# Patient Record
Sex: MALE | Race: OTHER | NOT HISPANIC OR LATINO | URBAN - METROPOLITAN AREA
[De-identification: names, ages, dates, MRNs, and addresses within clinical notes are randomized per-mention and may not be internally consistent; named-entity substitution may affect disease eponyms.]

---

## 2023-01-06 ENCOUNTER — EMERGENCY (EMERGENCY)
Facility: HOSPITAL | Age: 23
LOS: 1 days | Discharge: HOME | End: 2023-01-06
Attending: EMERGENCY MEDICINE | Admitting: EMERGENCY MEDICINE
Payer: MEDICAID

## 2023-01-06 VITALS
RESPIRATION RATE: 17 BRPM | OXYGEN SATURATION: 99 % | TEMPERATURE: 99 F | SYSTOLIC BLOOD PRESSURE: 133 MMHG | HEART RATE: 84 BPM | DIASTOLIC BLOOD PRESSURE: 80 MMHG

## 2023-01-06 VITALS
DIASTOLIC BLOOD PRESSURE: 95 MMHG | HEART RATE: 101 BPM | RESPIRATION RATE: 18 BRPM | SYSTOLIC BLOOD PRESSURE: 145 MMHG | OXYGEN SATURATION: 100 % | TEMPERATURE: 98 F

## 2023-01-06 DIAGNOSIS — Z98.890 OTHER SPECIFIED POSTPROCEDURAL STATES: Chronic | ICD-10-CM

## 2023-01-06 LAB
ALBUMIN SERPL ELPH-MCNC: 4.9 G/DL — SIGNIFICANT CHANGE UP (ref 3.3–5)
ALP SERPL-CCNC: 57 U/L — SIGNIFICANT CHANGE UP (ref 40–120)
ALT FLD-CCNC: 14 U/L — SIGNIFICANT CHANGE UP (ref 4–41)
ANION GAP SERPL CALC-SCNC: 11 MMOL/L — SIGNIFICANT CHANGE UP (ref 7–14)
AST SERPL-CCNC: 19 U/L — SIGNIFICANT CHANGE UP (ref 4–40)
BASOPHILS # BLD AUTO: 0.02 K/UL — SIGNIFICANT CHANGE UP (ref 0–0.2)
BASOPHILS NFR BLD AUTO: 0.3 % — SIGNIFICANT CHANGE UP (ref 0–2)
BILIRUB SERPL-MCNC: 0.7 MG/DL — SIGNIFICANT CHANGE UP (ref 0.2–1.2)
BUN SERPL-MCNC: 9 MG/DL — SIGNIFICANT CHANGE UP (ref 7–23)
CALCIUM SERPL-MCNC: 9.8 MG/DL — SIGNIFICANT CHANGE UP (ref 8.4–10.5)
CHLORIDE SERPL-SCNC: 102 MMOL/L — SIGNIFICANT CHANGE UP (ref 98–107)
CO2 SERPL-SCNC: 23 MMOL/L — SIGNIFICANT CHANGE UP (ref 22–31)
CREAT SERPL-MCNC: 0.93 MG/DL — SIGNIFICANT CHANGE UP (ref 0.5–1.3)
EGFR: 121 ML/MIN/1.73M2 — SIGNIFICANT CHANGE UP
EOSINOPHIL # BLD AUTO: 0 K/UL — SIGNIFICANT CHANGE UP (ref 0–0.5)
EOSINOPHIL NFR BLD AUTO: 0 % — SIGNIFICANT CHANGE UP (ref 0–6)
GLUCOSE SERPL-MCNC: 141 MG/DL — HIGH (ref 70–99)
HCT VFR BLD CALC: 45.3 % — SIGNIFICANT CHANGE UP (ref 39–50)
HGB BLD-MCNC: 15.1 G/DL — SIGNIFICANT CHANGE UP (ref 13–17)
IANC: 5.73 K/UL — SIGNIFICANT CHANGE UP (ref 1.8–7.4)
IMM GRANULOCYTES NFR BLD AUTO: 0.3 % — SIGNIFICANT CHANGE UP (ref 0–0.9)
LIDOCAIN IGE QN: 30 U/L — SIGNIFICANT CHANGE UP (ref 7–60)
LYMPHOCYTES # BLD AUTO: 1.02 K/UL — SIGNIFICANT CHANGE UP (ref 1–3.3)
LYMPHOCYTES # BLD AUTO: 13.7 % — SIGNIFICANT CHANGE UP (ref 13–44)
MCHC RBC-ENTMCNC: 27.8 PG — SIGNIFICANT CHANGE UP (ref 27–34)
MCHC RBC-ENTMCNC: 33.3 GM/DL — SIGNIFICANT CHANGE UP (ref 32–36)
MCV RBC AUTO: 83.4 FL — SIGNIFICANT CHANGE UP (ref 80–100)
MONOCYTES # BLD AUTO: 0.65 K/UL — SIGNIFICANT CHANGE UP (ref 0–0.9)
MONOCYTES NFR BLD AUTO: 8.7 % — SIGNIFICANT CHANGE UP (ref 2–14)
NEUTROPHILS # BLD AUTO: 5.73 K/UL — SIGNIFICANT CHANGE UP (ref 1.8–7.4)
NEUTROPHILS NFR BLD AUTO: 77 % — SIGNIFICANT CHANGE UP (ref 43–77)
NRBC # BLD: 0 /100 WBCS — SIGNIFICANT CHANGE UP (ref 0–0)
NRBC # FLD: 0 K/UL — SIGNIFICANT CHANGE UP (ref 0–0)
PLATELET # BLD AUTO: 257 K/UL — SIGNIFICANT CHANGE UP (ref 150–400)
POTASSIUM SERPL-MCNC: 3.9 MMOL/L — SIGNIFICANT CHANGE UP (ref 3.5–5.3)
POTASSIUM SERPL-SCNC: 3.9 MMOL/L — SIGNIFICANT CHANGE UP (ref 3.5–5.3)
PROT SERPL-MCNC: 7.4 G/DL — SIGNIFICANT CHANGE UP (ref 6–8.3)
RBC # BLD: 5.43 M/UL — SIGNIFICANT CHANGE UP (ref 4.2–5.8)
RBC # FLD: 12.8 % — SIGNIFICANT CHANGE UP (ref 10.3–14.5)
SODIUM SERPL-SCNC: 136 MMOL/L — SIGNIFICANT CHANGE UP (ref 135–145)
WBC # BLD: 7.44 K/UL — SIGNIFICANT CHANGE UP (ref 3.8–10.5)
WBC # FLD AUTO: 7.44 K/UL — SIGNIFICANT CHANGE UP (ref 3.8–10.5)

## 2023-01-06 PROCEDURE — 99284 EMERGENCY DEPT VISIT MOD MDM: CPT

## 2023-01-06 RX ORDER — FAMOTIDINE 10 MG/ML
20 INJECTION INTRAVENOUS ONCE
Refills: 0 | Status: COMPLETED | OUTPATIENT
Start: 2023-01-06 | End: 2023-01-06

## 2023-01-06 RX ADMIN — Medication 30 MILLILITER(S): at 13:15

## 2023-01-06 RX ADMIN — FAMOTIDINE 20 MILLIGRAM(S): 10 INJECTION INTRAVENOUS at 13:15

## 2023-01-06 NOTE — ED ADULT TRIAGE NOTE - CHIEF COMPLAINT QUOTE
Pt AOX4 c/o bloating in stomach and multiple episodes of vomiting; pt does have Hx of GERD; pt admits to recent excessive overuse of marijuana and he also vapes nicotine; has Hx of anxiety recently restarted Escitalopram 4 days ago   Takes Famotidine Pt AOX4 c/o bloating in stomach and multiple episodes of vomiting; pt does have Hx of GERD; pt admits to recent excessive overuse of marijuana and he also vapes nicotine; has Hx of anxiety recently restarted Escitalopram 4 days ago; Pt arrives with Mother - requests discretion discussing his use of marijuana please  Takes Famotidine

## 2023-01-06 NOTE — ED ADULT NURSE NOTE - OBJECTIVE STATEMENT
Pt received to [facility] , awake and alert, A&OX4, ambulatory. C/o generalized abd pain x1-2 days and nausea with associated vomiting. States he vomited 2x today and 2x yesterday. Denies bloody or bilious vomiting. States he recently restarted Lexapro. States he takes prescribed Famotidine at home without relief. Reporting regular bowel movements. Respirations even and unlabored. Resting comfortably. Denies CP, SOB, N/V, HA, dizziness, palpitations, fatigue. 20G IV placed to RAC. Bed in lowest position, call bell within reach. NAD

## 2023-01-06 NOTE — ED PROVIDER NOTE - PROGRESS NOTE DETAILS
SUZAN Masters SUZAN Masters: Workup reviewed - non-fasting glucose 141, remaining labs within normal limits, will have patient follow-up with PMD for reevaluation of glucose levels. Upon reassessment, patient non-tender on abdominal exam and reporting improvement of nausea with medications, no vomiting while in ED. Shared decision making with patient - discussed in depth importance of discontinuing marijuana/ nicotine vaping since it's likely contributing to repeated episodes of nausea/vomiting every few months, pt expressed willingness to quit and will make efforts to decrease marijuana use. Patient is stable for discharge and hemodynamically stable. Patient expressed understanding and was agreeable with plan, strict return precautions discussed. SUZAN Masters: Workup reviewed - non-fasting glucose 141, remaining labs within normal limits, will have patient follow-up with PMD for reevaluation of glucose levels. Upon reassessment, patient non-tender on abdominal exam and reporting improvement of nausea and epigastric pain with medications, no vomiting while in ED. Shared decision making with patient - discussed in depth importance of discontinuing marijuana/ nicotine vaping since it's likely contributing to repeated episodes of nausea/vomiting every few months, pt expressed willingness to quit and will make efforts to decrease marijuana use. Patient is stable for discharge and hemodynamically stable. Patient expressed understanding and was agreeable with plan, strict return precautions discussed.

## 2023-01-06 NOTE — ED PROVIDER NOTE - OBJECTIVE STATEMENT
22M with PMH GERD, Depression/Anxiety who presents to ED with nausea/vomiting x 2 days associated with epigastric pain. Reporting 2-3 episodes of non-bilious, non-bloody emesis yesterday morning and this morning, reports use of marijuana 2-3x daily as well as nicotine vape use daily, states that he recently restarted taking Escitalopram 4 days ago for Depression/Anxiety but has previously taken the medication without any side effects. Reports similar symptoms of epigastric pain with GERD and nausea/ vomiting over the past year which he believes started with his increase in marijuana use. No known ill contacts, no recent travel. Denies fever, chills, chest pain, shortness of breath, diarrhea, constipation, urinary symptoms such as dysuria, urinary frequency/urgency, extremity weakness, lightheadedness, dizziness, or headaches. 22M with PMH GERD, Depression/Anxiety who presents to ED with nausea/vomiting x 2 days associated with epigastric pain. Reporting 2-3 episodes of non-bilious, non-bloody emesis yesterday morning and this morning, reports use of marijuana 2-3x daily as well as nicotine vape use daily, states that he recently restarted taking Escitalopram 4 days ago for Depression/Anxiety but has previously taken the medication without any side effects. Reports similar symptoms of epigastric pain with GERD and nausea/ vomiting over the past year which he believes started with his increase in marijuana use. Able to tolerate foods and liquids without vomiting. No known ill contacts, no recent travel. Denies suicidal/homicidal ideation or plans to hurt self or others. Denies fever, chills, chest pain, shortness of breath, diarrhea, constipation, urinary symptoms such as dysuria, urinary frequency/urgency, extremity weakness, lightheadedness, dizziness, or headaches. 22M with PMH GERD, Depression/Anxiety who presents to ED with nausea/vomiting x 2 days associated with epigastric pain. Reporting 2-3 episodes of non-bilious, non-bloody emesis yesterday and this morning, reports use of marijuana 2-3x daily as well as nicotine vape use daily, states that he recently restarted taking Escitalopram 4 days ago for Depression/Anxiety but has previously taken the medication without any side effects. Reports similar symptoms of epigastric pain with GERD and nausea/ vomiting over the past year which he believes started with his increase in marijuana use, uses Famotidine for GERD. Able to tolerate foods and liquids without vomiting. No known ill contacts, no recent travel, no new foods. Denies suicidal/homicidal ideation or plans to hurt self or others. Denies fever, chills, chest pain, shortness of breath, diarrhea, constipation, urinary symptoms such as dysuria, urinary frequency/urgency, extremity weakness, lightheadedness, dizziness, or headaches.

## 2023-01-06 NOTE — ED PROVIDER NOTE - ABDOMINAL EXAM
soft/nontender.../nondistended/no organomegaly/no pulsating masses No rebound or guarding. Negative McBurney's, Negative Sotelo's./soft/nontender.../nondistended/no organomegaly/no pulsating masses Non-tender in all 4 quadrants, no rebound or guarding. Negative McBurney's, Negative Sotelo's./soft/nontender.../nondistended/no organomegaly/no pulsating masses

## 2023-01-06 NOTE — SBIRT NOTE ADULT - NSSBIRTDRGBRIEFINTDET_GEN_A_CORE
Provided SBIRT services: Full screen positive. Brief Intervention Performed. Screening results were reviewed with the patient and patient was provided information about healthy guidelines and potential negative consequences associated with level of risk. Motivation and readiness to reduce or stop use was discussed and goals and activities to make changes were suggested/offered.  Pt expresses he feels motivated to stop use of marijuana and states he feels confident he can abstain from it. Pt states he meets with counselor once a week, and plans to continue tx with them.

## 2023-01-06 NOTE — ED PROVIDER NOTE - NSFOLLOWUPINSTRUCTIONS_ED_ALL_ED_FT
Advance activity as tolerated.  Continue all previously prescribed medications as directed unless otherwise instructed.  Follow up with your primary care physician in 48-72 hours- bring copies of your results.  Return to the ER for worsening or persistent symptoms, and/or ANY NEW OR CONCERNING SYMPTOMS high fevers, worsening severity of abdominal pain, inability to eat / drink due to nausea/vomiting, blood in vomit. If you have issues obtaining follow up, please call: [phone] to obtain a doctor or specialist who takes your insurance in your area.  You may call [phone] to make an appointment with the internal medicine clinic.      Vomiting, Adult      Vomiting is when stomach contents forcefully come out of the mouth. Many people notice nausea before vomiting. Vomiting can make you feel weak and cause you to become dehydrated.    Dehydration can make you feel tired and thirsty, cause you to have a dry mouth, and decrease how often you urinate. Older adults and people who have other diseases or a weak body defense system (immune system) are at higher risk for dehydration. It is important to treat vomiting as told by your health care provider.      Follow these instructions at home:  Washing hands with soap and water.   Watch your symptoms for any changes. Tell your health care provider about them.      Eating and drinking       Bananas next to a bowl of applesauce.       A bottle of clear fruit juice and glass of water.     Follow these recommendations as told by your health care provider:  •Take an oral rehydration solution (ORS). This is a drink that is sold at pharmacies and retail stores.      •Eat bland, easy-to-digest foods in small amounts as you are able. These foods include bananas, applesauce, rice, lean meats, toast, and crackers.      •Drink clear fluids slowly and in small amounts as you are able. Clear fluids include water, ice chips, low-calorie sports drinks, and fruit juice that has water added (diluted fruit juice).      •Avoid drinking fluids that contain a lot of sugar or caffeine, such as energy drinks, sports drinks, and soda.      •Avoid alcohol.      •Avoid spicy or fatty foods.      General instructions     •Wash your hands often using soap and water for at least 20 seconds. If soap and water are not available, use hand .      •Make sure that everyone in your household washes their hands frequently.      •Take over-the-counter and prescription medicines only as told by your health care provider.      •Rest at home while you recover.      •Watch your condition for any changes.      •Keep all follow-up visits. This is important.        Contact a health care provider if:    •Your vomiting gets worse.      •You have new symptoms.      •You have a fever.      •You cannot drink fluids without vomiting.      •You feel light-headed or dizzy.      •You have a headache.      •You have muscle cramps.      •You have a rash.      •You have pain while urinating.        Get help right away if:    •You have pain in your chest, neck, arm, or jaw.      •Your heart is beating very quickly.      •You have trouble breathing or you are breathing very quickly.      •You feel extremely weak or you faint.      •Your skin feels cold and clammy.      •You feel confused.      •You have persistent vomiting.      •You have vomit that is bright red or looks like black coffee grounds.      •You have stools (feces) that are bloody or black, or stools that look like tar.      •You have a severe headache, a stiff neck, or both.      •You have severe pain, cramping, or bloating in your abdomen.    •You have signs of dehydration, such as:  •Dark urine, very little urine, or no urine.      •Cracked lips.      •Dry mouth.      •Sunken eyes.      •Sleepiness.      •Weakness.        These symptoms may be an emergency. Get help right away. Call 911.    • Do not wait to see if the symptoms will go away.        • Do not drive yourself to the hospital.         Summary    •Vomiting is when stomach contents forcefully come out of the mouth. Vomiting can cause you to become dehydrated.      •It is important to treat vomiting as told by your health care provider. Follow your health care provider's instructions about eating and drinking.      •Wash your hands often using soap and water for at least 20 seconds. If soap and water are not available, use hand .      •Watch your condition for any changes and for signs of dehydration.      •Keep all follow-up visits. This is important.      This information is not intended to replace advice given to you by your health care provider. Make sure you discuss any questions you have with your health care provider.

## 2023-01-06 NOTE — ED ADULT NURSE NOTE - CHIEF COMPLAINT QUOTE
Pt AOX4 c/o bloating in stomach and multiple episodes of vomiting; pt does have Hx of GERD; pt admits to recent excessive overuse of marijuana and he also vapes nicotine; has Hx of anxiety recently restarted Escitalopram 4 days ago; Pt arrives with Mother - requests discretion discussing his use of marijuana please  Takes Famotidine

## 2023-01-06 NOTE — ED PROVIDER NOTE - CLINICAL SUMMARY MEDICAL DECISION MAKING FREE TEXT BOX
22M with PMH GERD, Depression/Anxiety who presents to ED with nausea/vomiting x 2 days associated with epigastric pain. Patient currently afebrile, hemodynamically stable, spO2 100%. Based on history and physical, differentials include but are not limited to viral illness, GERD/Gastritis, cannabinoid hyperemesis. Plan to assess patient for acute pathology as listed above with Labs. Will administer medications for symptomatic relief, follow-up on results, and reassess. Adonay att: 22M with PMH GERD, Depression/Anxiety who presents to ED with nausea/vomiting x 2 days associated with epigastric pain. Patient currently afebrile, hemodynamically stable, spO2 100%. Based on history and physical, differentials include but are not limited to viral illness, GERD/Gastritis, cannabinoid hyperemesis. Plan to assess patient for acute pathology as listed above with Labs. Will administer medications for symptomatic relief, follow-up on results, and reassess. Discussed the benefits of quitting cannabis use if they are the cause of his symptoms.

## 2023-01-06 NOTE — ED PROVIDER NOTE - PATIENT PORTAL LINK FT
You can access the FollowMyHealth Patient Portal offered by [facility] by registering at the following website: [email or web address]. By joining [facility]’s FollowMyHealth portal, you will also be able to view your health information using other applications (apps) compatible with our system.
